# Patient Record
Sex: MALE | Race: ASIAN | NOT HISPANIC OR LATINO
[De-identification: names, ages, dates, MRNs, and addresses within clinical notes are randomized per-mention and may not be internally consistent; named-entity substitution may affect disease eponyms.]

---

## 2017-07-18 ENCOUNTER — APPOINTMENT (OUTPATIENT)
Dept: UROLOGY | Facility: CLINIC | Age: 67
End: 2017-07-18

## 2017-07-18 VITALS
TEMPERATURE: 97.3 F | BODY MASS INDEX: 26.08 KG/M2 | OXYGEN SATURATION: 97 % | WEIGHT: 138 LBS | DIASTOLIC BLOOD PRESSURE: 67 MMHG | HEART RATE: 53 BPM | SYSTOLIC BLOOD PRESSURE: 104 MMHG | RESPIRATION RATE: 17 BRPM

## 2017-07-27 LAB
ANION GAP SERPL CALC-SCNC: 17 MMOL/L
BUN SERPL-MCNC: 20 MG/DL
CALCIUM SERPL-MCNC: 10.1 MG/DL
CHLORIDE SERPL-SCNC: 100 MMOL/L
CO2 SERPL-SCNC: 25 MMOL/L
CREAT SERPL-MCNC: 1.3 MG/DL
GLUCOSE SERPL-MCNC: 68 MG/DL
POTASSIUM SERPL-SCNC: 4.7 MMOL/L
PSA FREE FLD-MCNC: 14.6
PSA FREE SERPL-MCNC: 0.69 NG/ML
PSA SERPL-MCNC: 4.74 NG/ML
SODIUM SERPL-SCNC: 142 MMOL/L

## 2018-07-24 ENCOUNTER — APPOINTMENT (OUTPATIENT)
Dept: UROLOGY | Facility: CLINIC | Age: 68
End: 2018-07-24
Payer: MEDICARE

## 2018-07-24 VITALS
DIASTOLIC BLOOD PRESSURE: 65 MMHG | RESPIRATION RATE: 18 BRPM | OXYGEN SATURATION: 97 % | HEART RATE: 58 BPM | TEMPERATURE: 97.6 F | SYSTOLIC BLOOD PRESSURE: 102 MMHG | BODY MASS INDEX: 26.45 KG/M2 | WEIGHT: 140 LBS

## 2018-07-24 LAB
ANION GAP SERPL CALC-SCNC: 15 MMOL/L
BUN SERPL-MCNC: 21 MG/DL
CALCIUM SERPL-MCNC: 9.5 MG/DL
CHLORIDE SERPL-SCNC: 103 MMOL/L
CO2 SERPL-SCNC: 27 MMOL/L
CREAT SERPL-MCNC: 1.11 MG/DL
GLUCOSE SERPL-MCNC: 92 MG/DL
POTASSIUM SERPL-SCNC: 4.3 MMOL/L
PSA FREE FLD-MCNC: 16.6
PSA FREE SERPL-MCNC: 0.74 NG/ML
PSA SERPL-MCNC: 4.47 NG/ML
SODIUM SERPL-SCNC: 145 MMOL/L

## 2018-07-24 PROCEDURE — 99214 OFFICE O/P EST MOD 30 MIN: CPT

## 2018-11-20 ENCOUNTER — APPOINTMENT (OUTPATIENT)
Dept: UROLOGY | Facility: CLINIC | Age: 68
End: 2018-11-20
Payer: MEDICARE

## 2018-11-20 VITALS
BODY MASS INDEX: 25.7 KG/M2 | DIASTOLIC BLOOD PRESSURE: 72 MMHG | SYSTOLIC BLOOD PRESSURE: 122 MMHG | HEART RATE: 59 BPM | TEMPERATURE: 97.6 F | OXYGEN SATURATION: 99 % | RESPIRATION RATE: 18 BRPM | WEIGHT: 136 LBS

## 2018-11-20 PROCEDURE — 99214 OFFICE O/P EST MOD 30 MIN: CPT

## 2018-11-24 LAB
APPEARANCE: CLEAR
BACTERIA UR CULT: NORMAL
BACTERIA: NEGATIVE
BILIRUBIN URINE: NEGATIVE
BLOOD URINE: NEGATIVE
C TRACH RRNA SPEC QL NAA+PROBE: NOT DETECTED
COLOR: YELLOW
GLUCOSE QUALITATIVE U: NEGATIVE MG/DL
HAV IGM SER QL: NONREACTIVE
HBV CORE IGM SER QL: NONREACTIVE
HBV SURFACE AG SER QL: NONREACTIVE
HCV AB SER QL: NONREACTIVE
HCV S/CO RATIO: 0.12 S/CO
HIV1+2 AB SPEC QL IA.RAPID: NONREACTIVE
HSV 1+2 IGG SER IA-IMP: NEGATIVE
HSV 1+2 IGG SER IA-IMP: POSITIVE
HSV1 IGG SER QL: 49.4 INDEX
HSV2 IGG SER QL: 0.13 INDEX
KETONES URINE: NEGATIVE
LEUKOCYTE ESTERASE URINE: NEGATIVE
MICROSCOPIC-UA: NORMAL
N GONORRHOEA RRNA SPEC QL NAA+PROBE: NOT DETECTED
NITRITE URINE: NEGATIVE
PH URINE: 7
PROTEIN URINE: NEGATIVE MG/DL
RED BLOOD CELLS URINE: 4 /HPF
SOURCE AMPLIFICATION: NORMAL
SPECIFIC GRAVITY URINE: 1.02
SQUAMOUS EPITHELIAL CELLS: 0 /HPF
T PALLIDUM AB SER QL IA: NEGATIVE
UROBILINOGEN URINE: NEGATIVE MG/DL
WHITE BLOOD CELLS URINE: 0 /HPF

## 2018-12-02 LAB
MYCOPLASMA HOMINIS CULTURE: NORMAL
UREAPLASMA CULTURE: NORMAL
UREAPLASMA, PRELIMINARY CULTURE: NORMAL

## 2018-12-11 ENCOUNTER — APPOINTMENT (OUTPATIENT)
Dept: UROLOGY | Facility: CLINIC | Age: 68
End: 2018-12-11
Payer: MEDICARE

## 2018-12-11 VITALS
SYSTOLIC BLOOD PRESSURE: 115 MMHG | HEART RATE: 75 BPM | BODY MASS INDEX: 25.89 KG/M2 | OXYGEN SATURATION: 100 % | WEIGHT: 137 LBS | DIASTOLIC BLOOD PRESSURE: 73 MMHG | TEMPERATURE: 98.1 F | RESPIRATION RATE: 18 BRPM

## 2018-12-11 PROCEDURE — 99213 OFFICE O/P EST LOW 20 MIN: CPT

## 2019-07-23 ENCOUNTER — APPOINTMENT (OUTPATIENT)
Dept: UROLOGY | Facility: CLINIC | Age: 69
End: 2019-07-23
Payer: MEDICARE

## 2019-07-23 VITALS
OXYGEN SATURATION: 97 % | BODY MASS INDEX: 26.64 KG/M2 | DIASTOLIC BLOOD PRESSURE: 77 MMHG | HEART RATE: 70 BPM | RESPIRATION RATE: 18 BRPM | WEIGHT: 141 LBS | SYSTOLIC BLOOD PRESSURE: 122 MMHG | TEMPERATURE: 98.6 F

## 2019-07-23 PROCEDURE — 99214 OFFICE O/P EST MOD 30 MIN: CPT

## 2019-07-23 NOTE — LETTER BODY
[FreeTextEntry1] : Sheryl Duval DO\par [employer] [facility]\par [address]\[place] [address]\par Phone: [phone]\par \par Dear Dr. Duval,\par \par Reason for visit: BPH. Elevated PSA.\par \par This is a 69 year-old gentleman with elevated PSA status post negative prostate biopsy and chronic BPH. His prostate MRI in 2015 was unremarkable. The patient returns today for follow up. Since his last visit, the patient reports he is doing well. He reports he obtained a blood test from his PCP, but did not bring his results in today. He continues to take Proscar and Flomax regularly without any side effects or difficulties. He reports stable urinary symptoms with the medication. Patient denies any urinary retention or hematuria or changes in health. Patient has no pain. The past medical history and family history and social history are unchanged. All other review of systems are negative. Patient denies any changes in medications. Medication list was reconciled.\par \par On examination, the patient is an older-appearing gentleman in no acute distress. He is alert and oriented follows commands. He has normal mood and affect. He is normocephalic. Oral no thrush. Neck is supple. Respirations are unlabored. His abdomen is soft and nontender. Liver is nonpalpable. Bladder is nonpalpable. No CVA tenderness. Neurologically he is grossly intact. No peripheral edema. Skin without gross abnormality. \par \par His previous PSA in 2018 was 4.47 on Proscar, which is improved. His BMP in 2018 demonstrated normal renal functions, creatinine 1.11.\par \par Assessment: BPH, symptoms stable with Flomax and Proscar. Elevated PSA, stable.\par \par I counseled the patient. In terms of his BPH, his symptoms are stable with medication. I renewed the patient's prescription for Flomax and Proscar today. I encouraged the patient to continue medications regularly as directed. In terms of his elevated PSA, I recommend he repeat PSA today to ensure stability. He will also obtain BMP today. Risks and alternatives were discussed. I answered the patient's questions. The patient will follow up as directed and will contact me with any questions or concerns. Thank you for the opportunity to participate in the care of this patient. I'll keep you updated on his progress.\par \par Plan: BMP. PSA. Follow up in 1 year.

## 2019-07-23 NOTE — ADDENDUM
[FreeTextEntry1] : Entered by Nico Gallagher, acting as scribe for Dr. Luis Hooper.\par \par The documentation recorded by the scribe accurately reflects the service I personally performed and the decisions made by me.

## 2019-07-27 LAB
ANION GAP SERPL CALC-SCNC: 12 MMOL/L
BUN SERPL-MCNC: 19 MG/DL
CALCIUM SERPL-MCNC: 9.1 MG/DL
CHLORIDE SERPL-SCNC: 103 MMOL/L
CO2 SERPL-SCNC: 26 MMOL/L
CREAT SERPL-MCNC: 1.11 MG/DL
GLUCOSE SERPL-MCNC: 91 MG/DL
POTASSIUM SERPL-SCNC: 4.6 MMOL/L
PSA FREE FLD-MCNC: 17 %
PSA FREE SERPL-MCNC: 0.87 NG/ML
PSA SERPL-MCNC: 5.13 NG/ML
SODIUM SERPL-SCNC: 140 MMOL/L

## 2020-07-21 ENCOUNTER — APPOINTMENT (OUTPATIENT)
Dept: UROLOGY | Facility: CLINIC | Age: 70
End: 2020-07-21
Payer: MEDICARE

## 2020-07-21 VITALS
OXYGEN SATURATION: 95 % | HEART RATE: 77 BPM | BODY MASS INDEX: 27.4 KG/M2 | TEMPERATURE: 97.4 F | DIASTOLIC BLOOD PRESSURE: 79 MMHG | SYSTOLIC BLOOD PRESSURE: 132 MMHG | WEIGHT: 145 LBS | RESPIRATION RATE: 18 BRPM

## 2020-07-21 PROCEDURE — 99214 OFFICE O/P EST MOD 30 MIN: CPT

## 2020-07-21 NOTE — LETTER BODY
[FreeTextEntry1] : Sheryl Duval DO\par [employer] [facility]\par [address]\[place] [address]\par Phone: [phone]\par \par Dear Dr. Duval,\par \par Reason for visit: BPH. Elevated PSA.\par \par This is a 70 year-old gentleman with elevated PSA status post negative prostate biopsy and chronic BPH. His prostate MRI in 2015 was PIRAD 2 unremarkable. The patient returns today for follow up. Since his last visit, the patient reports he is doing well. He continues to take Proscar and Flomax regularly without any side effects or difficulties. He reports stable urinary symptoms with the medication. Patient denies any urinary retention or hematuria or changes in health. Patient has no pain. The past medical history and family history and social history are unchanged. All other review of systems are negative. Patient denies any changes in medications. Medication list was reconciled.\par \par On examination, the patient is an older-appearing gentleman in no acute distress. He is alert and oriented follows commands. He has normal mood and affect. He is normocephalic. Oral no thrush. Neck is supple. Respirations are unlabored. His abdomen is soft and nontender. Liver is nonpalpable. Bladder is nonpalpable. No CVA tenderness. Neurologically he is grossly intact. No peripheral edema. Skin without gross abnormality.  On rectal examination, there is normal sphincter tone. The prostate is clinically benign without focal induration or nodularity. \par \par His previous PSA in 2019 was 5.13 on Proscar, which is stable.\par \par Assessment: BPH, symptoms stable with Flomax and Proscar. Elevated PSA, stable.\par \par I counseled the patient. In terms of his BPH, his symptoms are stable with medication. I renewed the patient's prescription for Flomax and Proscar today. I encouraged the patient to continue medications regularly as directed. In terms of his elevated PSA, I recommend he repeat PSA today to ensure stability.  Patient may need repeat prostate MRI if his PSA continues to increase.  He will also obtain BMP today. Risks and alternatives were discussed. I answered the patient's questions. The patient will follow up as directed and will contact me with any questions or concerns. Thank you for the opportunity to participate in the care of this patient. I'll keep you updated on his progress.\par \par Plan: BMP. PSA. Follow up in 1 year.

## 2020-07-25 LAB
ANION GAP SERPL CALC-SCNC: 13 MMOL/L
BUN SERPL-MCNC: 19 MG/DL
CALCIUM SERPL-MCNC: 9.4 MG/DL
CHLORIDE SERPL-SCNC: 103 MMOL/L
CO2 SERPL-SCNC: 24 MMOL/L
CREAT SERPL-MCNC: 1.1 MG/DL
GLUCOSE SERPL-MCNC: 96 MG/DL
POTASSIUM SERPL-SCNC: 4.9 MMOL/L
PSA FREE FLD-MCNC: 16 %
PSA FREE SERPL-MCNC: 0.7 NG/ML
PSA SERPL-MCNC: 4.36 NG/ML
SODIUM SERPL-SCNC: 141 MMOL/L

## 2021-07-27 ENCOUNTER — APPOINTMENT (OUTPATIENT)
Dept: UROLOGY | Facility: CLINIC | Age: 71
End: 2021-07-27

## 2021-08-27 ENCOUNTER — APPOINTMENT (OUTPATIENT)
Dept: UROLOGY | Facility: CLINIC | Age: 71
End: 2021-08-27
Payer: MEDICARE

## 2021-08-27 VITALS
OXYGEN SATURATION: 100 % | DIASTOLIC BLOOD PRESSURE: 77 MMHG | BODY MASS INDEX: 26.08 KG/M2 | RESPIRATION RATE: 18 BRPM | SYSTOLIC BLOOD PRESSURE: 124 MMHG | WEIGHT: 138 LBS | HEART RATE: 70 BPM | TEMPERATURE: 98.2 F

## 2021-08-27 PROCEDURE — 99214 OFFICE O/P EST MOD 30 MIN: CPT

## 2021-08-27 NOTE — ADDENDUM
[FreeTextEntry1] : Entered by Madison Roberts, acting as scribe for Dr. Luis Hooper.\par \par The documentation recorded by the scribe accurately reflects the service I personally performed and the decisions made by me.

## 2021-08-27 NOTE — LETTER BODY
[FreeTextEntry1] : Sheryl Duval DO\par [employer] [facility]\par [address]\[place] [address]\par Phone: [phone]\par \par Dear Dr. Duval,\par \par Reason for visit: BPH. Elevated PSA.\par \par This is a 71 year-old gentleman with elevated PSA status post negative prostate biopsy and chronic BPH. His prostate MRI in 2015 was PIRAD 2 unremarkable. The patient returns today for follow up. Since his last visit, the patient reports he is doing well. He continues to take Proscar and Flomax regularly without any side effects or difficulties. He reports stable urinary symptoms with the medication. Patient denies any urinary retention or hematuria or changes in health. Patient has no pain. The past medical history and family history and social history are unchanged. All other review of systems are negative. Patient denies any changes in medications. Medication list was reconciled.\par \par On examination, the patient is an older-appearing gentleman in no acute distress. He is alert and oriented follows commands. He has normal mood and affect. He is normocephalic. Oral no thrush. Neck is supple. Respirations are unlabored. His abdomen is soft and nontender. Liver is nonpalpable. Bladder is nonpalpable. No CVA tenderness. Neurologically he is grossly intact. No peripheral edema. Skin without gross abnormality. On rectal examination, there is normal sphincter tone. The prostate is clinically benign without focal induration or nodularity. \par \par His BMP from July 2020 demonstrated normal renal functions, creatinine 1.10. His PSA was 4.36, which is elevated. \par \par Assessment: BPH, symptoms stable with Flomax and Proscar. Elevated PSA. \par \par I counseled the patient. In terms of his BPH, his symptoms are stable with medication. I recommended the patient continue taking Flomax and Proscar.  I renewed the patient's prescription for Flomax and Proscar today. I encouraged the patient to continue medications regularly as directed. In terms of his elevated PSA, I recommend he repeat PSA and BMP today to ensure stability. Patient may need repeat prostate MRI if his PSA continues to increase. Patient understands that if he develops gross hematuria or any urinary discomfort, he will contact me for further evaluation.  Risks and alternatives were discussed. I answered the patient's questions. The patient will follow up as directed and will contact me with any questions or concerns. Thank you for the opportunity to participate in the care of this patient. I'll keep you updated on his progress.\par \par Plan: BMP. PSA. Continue Proscar and Flomax. Follow up in 1 year.

## 2021-08-27 NOTE — HISTORY OF PRESENT ILLNESS
[FreeTextEntry1] : Please refer to URO Consult note \par \par fu bph elevated psa \par psa 4.36 last time \par continues meds \par come back 1 year \par

## 2021-08-28 LAB
ANION GAP SERPL CALC-SCNC: 10 MMOL/L
BUN SERPL-MCNC: 22 MG/DL
CALCIUM SERPL-MCNC: 10 MG/DL
CHLORIDE SERPL-SCNC: 104 MMOL/L
CO2 SERPL-SCNC: 29 MMOL/L
CREAT SERPL-MCNC: 1.12 MG/DL
GLUCOSE SERPL-MCNC: 90 MG/DL
POTASSIUM SERPL-SCNC: 4.8 MMOL/L
PSA FREE FLD-MCNC: 19 %
PSA FREE SERPL-MCNC: 0.9 NG/ML
PSA SERPL-MCNC: 4.63 NG/ML
SODIUM SERPL-SCNC: 142 MMOL/L

## 2022-07-12 ENCOUNTER — LABORATORY RESULT (OUTPATIENT)
Age: 72
End: 2022-07-12

## 2022-07-12 ENCOUNTER — APPOINTMENT (OUTPATIENT)
Dept: UROLOGY | Facility: CLINIC | Age: 72
End: 2022-07-12

## 2022-07-12 VITALS
BODY MASS INDEX: 27.59 KG/M2 | RESPIRATION RATE: 18 BRPM | DIASTOLIC BLOOD PRESSURE: 78 MMHG | WEIGHT: 146 LBS | SYSTOLIC BLOOD PRESSURE: 147 MMHG | OXYGEN SATURATION: 97 % | HEART RATE: 83 BPM | TEMPERATURE: 96.7 F

## 2022-07-12 PROCEDURE — 99214 OFFICE O/P EST MOD 30 MIN: CPT

## 2022-07-12 RX ORDER — ATORVASTATIN CALCIUM 10 MG/1
10 TABLET, FILM COATED ORAL
Qty: 90 | Refills: 0 | Status: ACTIVE | COMMUNITY
Start: 2022-02-18

## 2022-07-12 RX ORDER — CLOBETASOL PROPIONATE 0.5 MG/G
0.05 OINTMENT TOPICAL
Qty: 60 | Refills: 0 | Status: ACTIVE | COMMUNITY
Start: 2022-02-18

## 2022-07-12 RX ORDER — AMMONIUM LACTATE 12 %
12 CREAM (GRAM) TOPICAL
Qty: 385 | Refills: 0 | Status: ACTIVE | COMMUNITY
Start: 2022-02-18

## 2022-07-12 RX ORDER — MELOXICAM 15 MG/1
15 TABLET ORAL
Qty: 30 | Refills: 0 | Status: ACTIVE | COMMUNITY
Start: 2022-07-10

## 2022-07-12 RX ORDER — CALCIUM CARBONATE/VITAMIN D3 500MG-5MCG
500-200 TABLET ORAL
Qty: 100 | Refills: 0 | Status: ACTIVE | COMMUNITY
Start: 2021-10-22

## 2022-07-12 RX ORDER — PREDNISOLONE ACETATE 10 MG/ML
1 SUSPENSION/ DROPS OPHTHALMIC
Qty: 15 | Refills: 0 | Status: ACTIVE | COMMUNITY
Start: 2022-03-23

## 2022-07-12 RX ORDER — BESIFLOXACIN 6 MG/ML
0.6 SUSPENSION OPHTHALMIC
Qty: 5 | Refills: 0 | Status: ACTIVE | COMMUNITY
Start: 2022-03-23

## 2022-07-12 RX ORDER — BROMFENAC SODIUM 0.7 MG/ML
0.07 SOLUTION/ DROPS OPHTHALMIC
Qty: 3 | Refills: 0 | Status: ACTIVE | COMMUNITY
Start: 2022-03-23

## 2022-07-12 RX ORDER — BENZONATATE 100 MG/1
100 CAPSULE ORAL
Qty: 30 | Refills: 0 | Status: ACTIVE | COMMUNITY
Start: 2022-02-18

## 2022-07-12 RX ORDER — DIPHENHYDRAMINE HCL 25 MG/1
25 TABLET ORAL
Qty: 30 | Refills: 0 | Status: ACTIVE | COMMUNITY
Start: 2022-02-18

## 2022-07-12 RX ORDER — BLOOD SUGAR DIAGNOSTIC
STRIP MISCELLANEOUS
Qty: 2 | Refills: 0 | Status: ACTIVE | COMMUNITY
Start: 2022-04-01

## 2022-07-12 RX ORDER — FLUOCINOLONE ACETONIDE 0.11 MG/ML
0.01 OIL AURICULAR (OTIC)
Qty: 20 | Refills: 0 | Status: ACTIVE | COMMUNITY
Start: 2022-02-18

## 2022-07-12 RX ORDER — CETIRIZINE HYDROCHLORIDE 10 MG/1
10 TABLET, COATED ORAL
Qty: 30 | Refills: 0 | Status: ACTIVE | COMMUNITY
Start: 2022-02-18

## 2022-07-12 NOTE — LETTER BODY
[FreeTextEntry1] : Sheryl Duval DO\par [employer] [facility]\par [address]\[place] [address]\par Phone: [phone]\par \par Dear Dr. Duval,\par \par Reason for visit: BPH. Elevated PSA.\par \par This is a 72 year-old gentleman with elevated PSA status post negative prostate biopsy and chronic BPH. His prostate MRI in 2015 was unremarkable, PI-RADS 2. The patient returns today for follow up. Since his last visit, the patient taking Flomax QD and Proscar regularly without any side effects or difficulties. He reports stable urinary symptoms with the medication. The patient denies any urinary retention or hematuria or changes in health. He is overall well. The patient has no pain. The past medical history and family history and social history are unchanged. All other review of systems are negative. Patient denies any changes in medications. Medication list was reconciled.\par \par On examination, the patient is an older-appearing gentleman in no acute distress. He is alert and oriented follows commands. He has normal mood and affect. He is normocephalic. Oral no thrush. Neck is supple. Respirations are unlabored. His abdomen is soft and nontender. Liver is nonpalpable. Bladder is nonpalpable. No CVA tenderness. Neurologically he is grossly intact. No peripheral edema. Skin without gross abnormality. On rectal examination, there is normal sphincter tone. The prostate is clinically benign without focal induration or nodularity. \par \par His BMP in August 2021 demonstrated normal renal functions, creatinine 1.12. His PSA was 4.63, which is elevated but stable. \par \par Assessment: BPH, symptoms stable on Flomax and Proscar. Elevated PSA. \par \par I counseled the patient. He is doing well. In terms of his BPH, his symptoms are stable with medication. I recommended the patient continue taking Flomax QD and Proscar. I renewed the patient's prescription for Flomax and Proscar today. I encouraged the patient to continue medications regularly as directed. In terms of his elevated PSA, his previous PSA was elevated but stable.  I recommended  he repeat PSA and BMP today to ensure stability. Risks and alternatives were discussed. I answered the patient's questions. The patient will follow up as directed and will contact me with any questions or concerns. Thank you for the opportunity to participate in the care of this patient. I'll keep you updated on his progress.\par \par Plan: Continue Flomax QD and Proscar. BMP. PSA. Follow up in 1 year.

## 2023-07-11 ENCOUNTER — APPOINTMENT (OUTPATIENT)
Dept: UROLOGY | Facility: CLINIC | Age: 73
End: 2023-07-11
Payer: MEDICARE

## 2023-07-11 VITALS
BODY MASS INDEX: 27.49 KG/M2 | DIASTOLIC BLOOD PRESSURE: 70 MMHG | HEART RATE: 78 BPM | WEIGHT: 145.5 LBS | OXYGEN SATURATION: 96 % | SYSTOLIC BLOOD PRESSURE: 110 MMHG | TEMPERATURE: 98.1 F | RESPIRATION RATE: 17 BRPM

## 2023-07-11 DIAGNOSIS — Z20.2 CONTACT WITH AND (SUSPECTED) EXPOSURE TO INFECTIONS WITH A PREDOMINANTLY SEXUAL MODE OF TRANSMISSION: ICD-10-CM

## 2023-07-11 DIAGNOSIS — Z00.00 ENCOUNTER FOR GENERAL ADULT MEDICAL EXAMINATION W/OUT ABNORMAL FINDINGS: ICD-10-CM

## 2023-07-11 PROCEDURE — 99214 OFFICE O/P EST MOD 30 MIN: CPT

## 2023-07-11 NOTE — LETTER BODY
[FreeTextEntry1] : Sheryl Duval DO\par [employer] [facility]\par [address]\[place] [address]\par Phone: [phone]\par \par Dear Dr. Duval,\par \par Reason for visit: BPH. Elevated PSA. Mild urinary frequency\par \par This is a 73 year-old gentleman with elevated PSA status post negative prostate biopsy and chronic BPH. His prostate MRI in 2015 was unremarkable, PI-RADS 2. The patient returns today for follow up. Since his last visit, the patient taking Flomax QD and Proscar regularly without any side effects or difficulties. He reports stable symptoms with the medication. He complains of mild urinary frequency. The patient denies any urinary retention or hematuria or changes in health. He is overall well. The patient has no pain. The past medical history and family history and social history are unchanged. All other review of systems are negative. Patient denies any changes in medications. Medication list was reconciled.\par \par On examination, the patient is an older-appearing gentleman in no acute distress. He is alert and oriented follows commands. He has normal mood and affect. He is normocephalic. Oral no thrush. Neck is supple. Respirations are unlabored. His abdomen is soft and nontender. Liver is nonpalpable. Bladder is nonpalpable. No CVA tenderness. Neurologically he is grossly intact. No peripheral edema. Skin without gross abnormality. On rectal examination, there is normal sphincter tone. The prostate is clinically benign without focal induration or nodularity. \par \par His BMP in July 2022 demonstrated normal renal functions, creatinine 1.15. His PSA was 3.53, which is elevated but stable. \par \par Assessment: BPH, symptoms stable on Flomax and Proscar. Elevated PSA. Mild urinary frequency\par \par I counseled the patient. He is doing well. In terms of his BPH, his symptoms are stable with medication. I recommended the patient continue taking Flomax QD and Proscar. I renewed the patient's prescription for Flomax and Proscar today. I encouraged the patient to continue medications regularly as directed. In terms of his elevated PSA, his previous PSA was elevated but stable. I recommended he repeat PSA and BMP today to ensure stability. In terms of his mild urinary frequency, patient declines medical therapy of oxybutynin. Risks and benefits were discussed. I answered the patient's questions. The patient will follow up as directed and will contact me with any questions or concerns. Thank you for the opportunity to participate in the care of this patient. I'll keep you updated on his progress.\par \par Plan: Continue Flomax QD and Proscar. BMP. PSA. Follow up in 1 year. \par

## 2023-07-11 NOTE — ADDENDUM
[FreeTextEntry1] : Entered by Jaky Millan, acting as scribe for Dr. Luis Hooper.\par The documentation recorded by the scribe accurately reflects the service I personally performed and the decisions made by me.

## 2023-07-12 LAB
ANION GAP SERPL CALC-SCNC: 12 MMOL/L
BUN SERPL-MCNC: 22 MG/DL
CALCIUM SERPL-MCNC: 9.3 MG/DL
CHLORIDE SERPL-SCNC: 103 MMOL/L
CO2 SERPL-SCNC: 25 MMOL/L
CREAT SERPL-MCNC: 1.15 MG/DL
EGFR: 67 ML/MIN/1.73M2
GLUCOSE SERPL-MCNC: 122 MG/DL
POTASSIUM SERPL-SCNC: 4 MMOL/L
PSA FREE FLD-MCNC: 16 %
PSA FREE SERPL-MCNC: 0.62 NG/ML
PSA SERPL-MCNC: 3.85 NG/ML
SODIUM SERPL-SCNC: 140 MMOL/L

## 2024-06-07 ENCOUNTER — APPOINTMENT (OUTPATIENT)
Dept: UROLOGY | Facility: CLINIC | Age: 74
End: 2024-06-07
Payer: MEDICARE

## 2024-06-07 VITALS
RESPIRATION RATE: 16 BRPM | HEART RATE: 71 BPM | WEIGHT: 140 LBS | OXYGEN SATURATION: 97 % | DIASTOLIC BLOOD PRESSURE: 74 MMHG | SYSTOLIC BLOOD PRESSURE: 130 MMHG | BODY MASS INDEX: 26.45 KG/M2

## 2024-06-07 DIAGNOSIS — N40.1 BENIGN PROSTATIC HYPERPLASIA WITH LOWER URINARY TRACT SYMPMS: ICD-10-CM

## 2024-06-07 DIAGNOSIS — R97.20 ELEVATED PROSTATE, SPECIFIC ANTIGEN [PSA]: ICD-10-CM

## 2024-06-07 PROCEDURE — 99214 OFFICE O/P EST MOD 30 MIN: CPT

## 2024-06-07 NOTE — LETTER BODY
[FreeTextEntry1] : Sheryl Duval [place] [address] Phone: [phone]    Dear Dr. Duval,    Reason for visit: BPH. Elevated PSA. Mild urinary frequency    This is a 73 year-old gentleman with elevated PSA status post negative prostate biopsy, mild urinary frequency, and chronic BPH. His PSA in 2015 was 9. His prostate MRI in 2015 was unremarkable, PI-RADS 2. His recent PSA was 4.77, which is elevated. The patient returns today for follow up. Since his last visit, the patient reports taking Flomax QD and Proscar regularly without any side effects or difficulties. He reports stable symptoms with the medication. The patient denies any urinary retention or hematuria or changes in health. He is overall well. The patient has no pain. The past medical history and family history and social history are unchanged. All other review of systems are negative. Patient denies any changes in medications. Medication list was reconciled.    On examination, the patient is an older-appearing gentleman in no acute distress. He is alert and oriented follows commands. He has normal mood and affect. He is normocephalic. Oral no thrush. Neck is supple. Respirations are unlabored. His abdomen is soft and nontender. Liver is nonpalpable. Bladder is nonpalpable. No CVA tenderness. Neurologically he is grossly intact. No peripheral edema. Skin without gross abnormality. On rectal examination, there is normal sphincter tone. The prostate is clinically benign without focal induration or nodularity.    His BMP in July 2023 demonstrated normal renal functions, creatinine 1.15. His PSA was 4.77, which is elevated.    Assessment: BPH, symptoms stable on Flomax and Proscar. Elevated PSA.     I counseled the patient. He is doing well. In terms of his BPH, his symptoms are stable with medication. I recommended the patient continue taking Flomax QD and Proscar. I renewed the patient's prescription for Flomax and Proscar today. I encouraged the patient to continue medications regularly as directed. In terms of his elevated PSA, his recent PSA was elevated, 4.77. I recommended the patient undergo prostate MRI for fusion targeted prostate biopsy. I counseled the patient regarding the procedure. The patient will take the necessary preparations for the procedure. I recommended he repeat PSA and BMP today to ensure stability. Risks and benefits were discussed. I answered the patient's questions. The patient will follow up as directed and will contact me with any questions or concerns. Thank you for the opportunity to participate in the care of this patient. I'll keep you updated on his progress.  Patient will continue longitudinal care for their complex and chronic condition.  Plan: Continue Flomax QD and Proscar. Fusion targeted prostate biopsy. BMP. PSA. Follow up in 1 year.

## 2024-06-07 NOTE — ADDENDUM
[FreeTextEntry1] : Entered by Juan C Swann, acting as scribe for Dr. Luis Hooper. The documentation recorded by the scribe accurately reflects the service I personally performed and the decisions made by me.

## 2024-06-09 LAB
ANION GAP SERPL CALC-SCNC: 13 MMOL/L
BUN SERPL-MCNC: 17 MG/DL
CALCIUM SERPL-MCNC: 9.3 MG/DL
CHLORIDE SERPL-SCNC: 103 MMOL/L
CO2 SERPL-SCNC: 26 MMOL/L
CREAT SERPL-MCNC: 1.05 MG/DL
EGFR: 75 ML/MIN/1.73M2
GLUCOSE SERPL-MCNC: 115 MG/DL
POTASSIUM SERPL-SCNC: 4.5 MMOL/L
PSA FREE FLD-MCNC: 13 %
PSA FREE SERPL-MCNC: 0.62 NG/ML
PSA SERPL-MCNC: 4.71 NG/ML
SODIUM SERPL-SCNC: 141 MMOL/L

## 2024-06-18 ENCOUNTER — NON-APPOINTMENT (OUTPATIENT)
Age: 74
End: 2024-06-18

## 2024-06-18 RX ORDER — ENEMA 19; 7 G/133ML; G/133ML
7-19 ENEMA RECTAL
Qty: 1 | Refills: 0 | Status: ACTIVE | COMMUNITY
Start: 2024-06-18 | End: 1900-01-01

## 2024-07-03 ENCOUNTER — NON-APPOINTMENT (OUTPATIENT)
Age: 74
End: 2024-07-03

## 2024-07-10 ENCOUNTER — NON-APPOINTMENT (OUTPATIENT)
Age: 74
End: 2024-07-10

## 2024-12-06 ENCOUNTER — APPOINTMENT (OUTPATIENT)
Dept: UROLOGY | Facility: CLINIC | Age: 74
End: 2024-12-06
Payer: MEDICARE

## 2024-12-06 VITALS
DIASTOLIC BLOOD PRESSURE: 75 MMHG | WEIGHT: 141 LBS | OXYGEN SATURATION: 100 % | RESPIRATION RATE: 16 BRPM | BODY MASS INDEX: 26.64 KG/M2 | SYSTOLIC BLOOD PRESSURE: 138 MMHG | TEMPERATURE: 97.3 F | HEART RATE: 55 BPM

## 2024-12-06 DIAGNOSIS — R97.20 ELEVATED PROSTATE, SPECIFIC ANTIGEN [PSA]: ICD-10-CM

## 2024-12-06 DIAGNOSIS — N40.1 BENIGN PROSTATIC HYPERPLASIA WITH LOWER URINARY TRACT SYMPMS: ICD-10-CM

## 2024-12-06 LAB
ANION GAP SERPL CALC-SCNC: 13 MMOL/L
BUN SERPL-MCNC: 24 MG/DL
CALCIUM SERPL-MCNC: 10.1 MG/DL
CHLORIDE SERPL-SCNC: 102 MMOL/L
CO2 SERPL-SCNC: 26 MMOL/L
CREAT SERPL-MCNC: 1.21 MG/DL
EGFR: 63 ML/MIN/1.73M2
GLUCOSE SERPL-MCNC: 96 MG/DL
POTASSIUM SERPL-SCNC: 5.2 MMOL/L
PSA FREE FLD-MCNC: 18 %
PSA FREE SERPL-MCNC: 0.78 NG/ML
PSA SERPL-MCNC: 4.31 NG/ML
SODIUM SERPL-SCNC: 142 MMOL/L
T4 SERPL-MCNC: 6 UG/DL
THYROGLOB AB SERPL-ACNC: 17.4 IU/ML
THYROPEROXIDASE AB SERPL IA-ACNC: 10 IU/ML
TSH SERPL-ACNC: 3.19 UIU/ML

## 2024-12-06 PROCEDURE — G2211 COMPLEX E/M VISIT ADD ON: CPT

## 2024-12-06 PROCEDURE — 99214 OFFICE O/P EST MOD 30 MIN: CPT
